# Patient Record
Sex: MALE | Race: WHITE | NOT HISPANIC OR LATINO | URBAN - METROPOLITAN AREA
[De-identification: names, ages, dates, MRNs, and addresses within clinical notes are randomized per-mention and may not be internally consistent; named-entity substitution may affect disease eponyms.]

---

## 2024-12-21 ENCOUNTER — EMERGENCY (EMERGENCY)
Facility: HOSPITAL | Age: 19
LOS: 1 days | Discharge: HOME | End: 2024-12-21
Attending: STUDENT IN AN ORGANIZED HEALTH CARE EDUCATION/TRAINING PROGRAM | Admitting: STUDENT IN AN ORGANIZED HEALTH CARE EDUCATION/TRAINING PROGRAM
Payer: COMMERCIAL

## 2024-12-21 VITALS
RESPIRATION RATE: 18 BRPM | TEMPERATURE: 98 F | WEIGHT: 154.98 LBS | DIASTOLIC BLOOD PRESSURE: 82 MMHG | OXYGEN SATURATION: 98 % | HEART RATE: 92 BPM | HEIGHT: 69 IN | SYSTOLIC BLOOD PRESSURE: 120 MMHG

## 2024-12-21 VITALS
TEMPERATURE: 99 F | OXYGEN SATURATION: 99 % | RESPIRATION RATE: 18 BRPM | DIASTOLIC BLOOD PRESSURE: 76 MMHG | HEART RATE: 72 BPM | SYSTOLIC BLOOD PRESSURE: 107 MMHG

## 2024-12-21 LAB
APPEARANCE UR: CLEAR — SIGNIFICANT CHANGE UP
BILIRUB UR-MCNC: NEGATIVE — SIGNIFICANT CHANGE UP
COLOR SPEC: YELLOW — SIGNIFICANT CHANGE UP
DIFF PNL FLD: NEGATIVE — SIGNIFICANT CHANGE UP
GLUCOSE UR QL: NEGATIVE MG/DL — SIGNIFICANT CHANGE UP
KETONES UR-MCNC: NEGATIVE MG/DL — SIGNIFICANT CHANGE UP
LEUKOCYTE ESTERASE UR-ACNC: NEGATIVE — SIGNIFICANT CHANGE UP
NITRITE UR-MCNC: NEGATIVE — SIGNIFICANT CHANGE UP
PH UR: 6 — SIGNIFICANT CHANGE UP (ref 5–8)
PROT UR-MCNC: NEGATIVE MG/DL — SIGNIFICANT CHANGE UP
SP GR SPEC: 1.02 — SIGNIFICANT CHANGE UP (ref 1–1.03)
UROBILINOGEN FLD QL: 0.2 MG/DL — SIGNIFICANT CHANGE UP (ref 0.2–1)

## 2024-12-21 PROCEDURE — 93975 VASCULAR STUDY: CPT | Mod: 26

## 2024-12-21 PROCEDURE — 93975 VASCULAR STUDY: CPT

## 2024-12-21 PROCEDURE — 81003 URINALYSIS AUTO W/O SCOPE: CPT

## 2024-12-21 PROCEDURE — 99284 EMERGENCY DEPT VISIT MOD MDM: CPT | Mod: 25

## 2024-12-21 PROCEDURE — 99284 EMERGENCY DEPT VISIT MOD MDM: CPT

## 2024-12-21 PROCEDURE — 76870 US EXAM SCROTUM: CPT | Mod: 26

## 2024-12-21 PROCEDURE — 76870 US EXAM SCROTUM: CPT

## 2024-12-21 NOTE — ED ADULT NURSE NOTE - ASSOCIATED SYMPTOMS
intermittent pain in the left testicle since yesterday afternoon ranging from 2/10-7/10 with no history of heavy lifting nor trauma. Patient does not know any aggravating or relieving factors. States brother has HX of epididymitis.

## 2024-12-21 NOTE — ED PROVIDER NOTE - PATIENT PORTAL LINK FT
You can access the FollowMyHealth Patient Portal offered by [facility] by registering at the following website: [email or web address]. By joining [employer]’s FollowMyHealth portal, you will also be able to view your health information using other applications (apps) compatible with our system.

## 2024-12-21 NOTE — ED PROVIDER NOTE - ATTENDING APP SHARED VISIT CONTRIBUTION OF CARE
Cruz ATTG See MDM I performed a history and physical exam of the patient and discussed their management with the Physician assistant reviewed the PAs note and agree with the documented findings and plan of care. My medical decision making and observations are found above.

## 2024-12-21 NOTE — ED PROVIDER NOTE - CLINICAL SUMMARY MEDICAL DECISION MAKING FREE TEXT BOX
Patient is a 19-year-old otherwise healthy male coming in complaining of left testicular pain since yesterday patient states pain is now 2 out of 10 was 7 out of 10.  Patient denies any trauma heavy lifting dysuria hematuria nausea vomiting abdominal pain fever penile discharge.  Patient states he is not sexually active.  Patient denies any surgical history.  Patient states he iced the area overnight.     non tender abdomen

## 2024-12-21 NOTE — ED PROVIDER NOTE - GENITOURINARY, MLM
No discharge, lesions. circumcized male no penile discharge no testicular swelling mass no redness mild ttp left testicle

## 2024-12-21 NOTE — ED PROVIDER NOTE - NSFOLLOWUPINSTRUCTIONS_ED_ALL_ED_FT
Follow up with urology  return to er for any worsening symptoms     Scrotal Pain    WHAT YOU NEED TO KNOW:    What do I need to know about scrotal pain? Scrotal pain can happen at any age. The cause of scrotal pain can range from a minor injury to a serious medical condition. It is very important to seek immediate care if you have scrotal pain. The pain may be a warning sign of a serious condition that will need treatment. Without immediate care, you may be at increased risk for losing a testicle or being sterile (not having children).    What may cause scrotal pain?    Torsion (twisting) of the testicle, cord that carries sperm from the testicle, or tissue attached to the testicle    An infection of the testicle or other area in the scrotum    A hydrocele (fluid buildup around the testicle) or varicocele (blood backup in veins in the scrotum)    An inguinal hernia (tissue pushed out of place in your groin)    Charlotte gangrene (tissue death of the area between the scrotum and anus)    A urinary tract infection or stone that is passing, or an infected appendix    An injury in your groin or scrotum  What are the warning signs of a serious medical problem? Seek care immediately if you have any of the following:    Pain that starts suddenly or is severe    Swelling in your scrotum or groin, especially if you also have severe pain or are vomiting    Red or black patches of skin on your scrotum or area between your penis and anus    Blisters anywhere in your groin or scrotum    A fever  How is the cause of scrotal pain diagnosed? Your healthcare provider will examine you and ask about your pain. Tell the provider when the pain started and how long it lasts. Your provider will ask if pain started in another area and moved to your scrotum. The pain may also have moved from your scrotum to another area. Tell your provider if you have pain during exercise or if you had an injury to your groin. Also tell your provider if you have any problems urinating or if any discharge came out of your penis. Your provider may also ask about your sexual activity.    Blood tests may be used to check for signs of infection.    Ultrasound pictures may show a problem with your testicles or tissues in your scrotum. An ultrasound may also show kidney stones or other problems that may be causing your pain.  How is scrotal pain treated? Treatment will depend on the cause of your pain:    Prescription pain medicine may be given. Ask your healthcare provider how to take this medicine safely. Some prescription pain medicines contain acetaminophen. Do not take other medicines that contain acetaminophen without talking to your healthcare provider. Too much acetaminophen may cause liver damage. Prescription pain medicine may cause constipation. Ask your healthcare provider how to prevent or treat constipation.    NSAIDs, such as ibuprofen, help decrease swelling, pain, and fever. This medicine is available with or without a doctor's order. NSAIDs can cause stomach bleeding or kidney problems in certain people. If you take blood thinner medicine, always ask your healthcare provider if NSAIDs are safe for you. Always read the medicine label and follow directions.    Antibiotics are used to treat a bacterial infection.    Surgery may be needed to untwist the testicle, or cord, or to remove dead or infected tissue.  What can I do to manage my symptoms?    Wear a support device, if directed. A support device, such as a jock strap, can help keep your scrotum lifted and supported. This can help decrease pain.    Apply ice to your scrotum. Ice helps decrease pain and swelling. Use an ice pack, or put crushed ice in a plastic bag. Cover the pack or bag with a towel before you apply it to your scrotum. Apply ice for 15 to 20 minutes every hour, or as directed.  When should I seek immediate care?    You have any warning signs of a serious problem.    You have pain or swelling that starts or gets worse quickly.    You have skin changes in your scrotum, such as a dark patch.    You have a fever.  When should I contact my healthcare provider?    Your pain does not get better, even after you take pain medicine.    You have new or worsening pain.    You have questions or concerns about your condition or care.  CARE AGREEMENT:    You have the right to help plan your care. Learn about your health condition and how it may be treated. Discuss treatment options with your healthcare providers to decide what care you want to receive. You always have the right to refuse treatment.

## 2024-12-21 NOTE — ED PROVIDER NOTE - OBJECTIVE STATEMENT
Patient is a 19-year-old otherwise healthy male coming in complaining of left testicular pain since yesterday patient states pain is now 2 out of 10 was 7 out of 10.  Patient denies any trauma heavy lifting dysuria hematuria nausea vomiting abdominal pain fever penile discharge.  Patient states he is not sexually active.  Patient denies any surgical history.  Patient states he iced the area overnight.

## 2024-12-21 NOTE — ED PROVIDER NOTE - CARE PROVIDERS DIRECT ADDRESSES
[email or web address]

## 2024-12-21 NOTE — ED PROVIDER NOTE - CARE PROVIDER_API CALL
Romero Bloom Patrick  Urology  [address], [address]  [place] [address]  Phone: [phone]  Fax: [phone]  Follow Up Time:

## 2024-12-21 NOTE — ED ADULT NURSE NOTE - ED STAT RN HANDOFF DETAILS
discharge instructions explained and given to patient and mother.  patient safely discharged from unit with mom.